# Patient Record
Sex: MALE | Race: WHITE
[De-identification: names, ages, dates, MRNs, and addresses within clinical notes are randomized per-mention and may not be internally consistent; named-entity substitution may affect disease eponyms.]

---

## 2017-06-11 ENCOUNTER — HOSPITAL ENCOUNTER (EMERGENCY)
Dept: HOSPITAL 1 - ED | Age: 37
Discharge: HOME | End: 2017-06-11
Payer: COMMERCIAL

## 2017-06-11 VITALS — SYSTOLIC BLOOD PRESSURE: 125 MMHG | DIASTOLIC BLOOD PRESSURE: 82 MMHG

## 2017-06-11 DIAGNOSIS — E10.65: Primary | ICD-10-CM

## 2017-06-11 DIAGNOSIS — Z79.4: ICD-10-CM

## 2017-07-09 ENCOUNTER — HOSPITAL ENCOUNTER (EMERGENCY)
Dept: HOSPITAL 1 - ED | Age: 37
Discharge: HOME | End: 2017-07-09
Payer: COMMERCIAL

## 2017-07-09 VITALS — BODY MASS INDEX: 25.05 KG/M2 | WEIGHT: 169.12 LBS | HEIGHT: 69 IN

## 2017-07-09 VITALS — DIASTOLIC BLOOD PRESSURE: 76 MMHG | SYSTOLIC BLOOD PRESSURE: 115 MMHG

## 2017-07-09 DIAGNOSIS — E11.65: Primary | ICD-10-CM

## 2017-07-09 DIAGNOSIS — Z79.4: ICD-10-CM

## 2017-09-12 ENCOUNTER — HOSPITAL ENCOUNTER (EMERGENCY)
Dept: HOSPITAL 1 - ED | Age: 37
Discharge: HOME | End: 2017-09-12
Payer: COMMERCIAL

## 2017-09-12 VITALS — BODY MASS INDEX: 25.92 KG/M2 | HEIGHT: 69 IN | WEIGHT: 174.98 LBS

## 2017-09-12 VITALS — DIASTOLIC BLOOD PRESSURE: 75 MMHG | SYSTOLIC BLOOD PRESSURE: 124 MMHG
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
## 2017-09-12 DIAGNOSIS — S63.502A: ICD-10-CM

## 2017-09-12 DIAGNOSIS — S93.402A: Primary | ICD-10-CM
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
## 2017-09-12 DIAGNOSIS — S83.92XA: ICD-10-CM
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
## 2017-11-22 ENCOUNTER — HOSPITAL ENCOUNTER (EMERGENCY)
Dept: HOSPITAL 26 - MED | Age: 37
Discharge: HOME | End: 2017-11-22
Payer: COMMERCIAL

## 2017-11-22 VITALS — WEIGHT: 175 LBS | BODY MASS INDEX: 25.92 KG/M2 | HEIGHT: 69 IN

## 2017-11-22 VITALS — DIASTOLIC BLOOD PRESSURE: 85 MMHG | SYSTOLIC BLOOD PRESSURE: 139 MMHG

## 2017-11-22 VITALS — DIASTOLIC BLOOD PRESSURE: 81 MMHG | SYSTOLIC BLOOD PRESSURE: 129 MMHG

## 2017-11-22 DIAGNOSIS — Z79.899: ICD-10-CM
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
## 2017-11-22 DIAGNOSIS — Z79.84: ICD-10-CM
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
## 2017-11-22 DIAGNOSIS — E11.9: ICD-10-CM

## 2017-11-22 DIAGNOSIS — S60.011A: Primary | ICD-10-CM

## 2017-11-22 DIAGNOSIS — I10: ICD-10-CM

## 2017-11-22 DIAGNOSIS — Z79.4: ICD-10-CM
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
## 2017-11-22 PROCEDURE — 73130 X-RAY EXAM OF HAND: CPT

## 2017-11-22 PROCEDURE — 96372 THER/PROPH/DIAG INJ SC/IM: CPT

## 2017-11-22 PROCEDURE — 99284 EMERGENCY DEPT VISIT MOD MDM: CPT

## 2017-11-22 NOTE — NUR
Patient discharged with v/s stable. Written and verbal after care instructions 
given and explained. 

Patient alert, oriented and verbalized understanding of instructions. 
Ambulatory with steady gait. All questions addressed prior to discharge. ID 
band removed. Patient advised to follow up with PMD. Rx of NAPROXEN 500MG TAB 
given. Patient educated on indication of medication including possible reaction 
and side effects. Opportunity to ask questions provided and answered.

## 2017-11-22 NOTE — NUR
37M BIB SELF C/O RIGHT THUMB AND RIGHT INDEX FINGER PAIN, NON-RADIATING, 
ACHING, 3/10 X2 WEEKS; PT STATES NO RECENT TRAUMA OR INJURY TO SITE AT THIS 
TIME; PT STATES " I'M NOT SURE, MAYBE I INJURED IT AT WORK"; PT RT CAP REFILL 
IMMEDIATE, RT RADIAL PULSE +3, NO LOSS OF SENSATION TO RT HAND AT THIS TIME; NO 
SWELLING OR ERYTHEMA NOTED TO SITE AT THIS TIME; PT AA&OX4, PERRLA, BL LUNG 
SOUNDS CLEAR, RR EVEN/UNLABORED, SKIN IS WARM/DRY/INTACT AT THIS TIME; STEADY 
GAIT; PT RESTING IN BED WITH HOB ELEVATED AND IN LOWEST POSITION; POSITIONED 
FOR COMFORT; ER MD MADE AWARE OF STATUS. WILL CONTINUE TO MONITOR.

## 2020-06-10 ENCOUNTER — HOSPITAL ENCOUNTER (EMERGENCY)
Dept: HOSPITAL 1 - ED | Age: 40
Discharge: HOME | End: 2020-06-10
Payer: COMMERCIAL

## 2020-06-10 VITALS — DIASTOLIC BLOOD PRESSURE: 89 MMHG | SYSTOLIC BLOOD PRESSURE: 135 MMHG

## 2020-06-10 VITALS
WEIGHT: 168 LBS | WEIGHT: 168 LBS | HEIGHT: 69 IN | BODY MASS INDEX: 24.88 KG/M2 | BODY MASS INDEX: 24.88 KG/M2 | HEIGHT: 69 IN

## 2020-06-10 DIAGNOSIS — E11.9: ICD-10-CM

## 2020-06-10 DIAGNOSIS — U07.1: Primary | ICD-10-CM

## 2020-06-10 DIAGNOSIS — M25.561: ICD-10-CM

## 2020-06-10 DIAGNOSIS — M25.562: ICD-10-CM
